# Patient Record
Sex: FEMALE | Race: WHITE
[De-identification: names, ages, dates, MRNs, and addresses within clinical notes are randomized per-mention and may not be internally consistent; named-entity substitution may affect disease eponyms.]

---

## 2018-01-30 ENCOUNTER — HOSPITAL ENCOUNTER (EMERGENCY)
Dept: HOSPITAL 17 - NEPC | Age: 28
LOS: 1 days | Discharge: HOME | End: 2018-01-31
Payer: MEDICAID

## 2018-01-30 VITALS
OXYGEN SATURATION: 98 % | SYSTOLIC BLOOD PRESSURE: 129 MMHG | TEMPERATURE: 98.1 F | HEART RATE: 125 BPM | RESPIRATION RATE: 16 BRPM | DIASTOLIC BLOOD PRESSURE: 66 MMHG

## 2018-01-30 VITALS — WEIGHT: 250.53 LBS | BODY MASS INDEX: 39.32 KG/M2 | HEIGHT: 67 IN

## 2018-01-30 DIAGNOSIS — R10.11: Primary | ICD-10-CM

## 2018-01-30 PROCEDURE — 76705 ECHO EXAM OF ABDOMEN: CPT

## 2018-01-30 PROCEDURE — 99281 EMR DPT VST MAYX REQ PHY/QHP: CPT

## 2018-01-30 NOTE — PD
HPI


Chief Complaint:  Abdominal Pain


Time Seen by Provider:  20:37


Travel History


International Travel<30 days:  No


Contact w/Intl Traveler<30days:  No


Traveled to known affect area:  No





History of Present Illness


HPI


28-year-old female presents to the emergency room for evaluation of right upper 

quadrant abdominal pain that started in the middle of the night last night.  

Patient went to [place] ED and had a negative workup including normal liver 

enzymes, lipase, troponin, CBC, UA, and CT abdomen and pelvis.  She reports 

pain is constant, sharp, like someone is ripping her insides out.  She was 

given Toradol without relief in symptoms.  She was then given morphine and 

Dilaudid.  States morphine helped a little but the Dilaudid did not help at 

all.  No alleviating or aggravating symptoms.  She had one episode of vomiting.

  No previous abdominal surgeries.  She was sent from [facility] for ultrasound of 

the gallbladder.





PFSH


Past Medical History


Autoimmune Disease:  No


Anxiety:  Yes


Depression:  Yes


Cancer:  No


Cardiovascular Problems:  No


Chemotherapy:  No


Diabetes:  Yes (Per mother pt has been since 2012)


Endocrine:  No


Headaches:  Yes (migraines)


Immune Disorder:  No


Neurologic:  Yes


Psychiatric:  Yes


Immunizations Current:  Yes


Radiation Therapy:  No


Seizures:  Yes


Sickle Cell Disease:  No


Thyroid Disease:  No


Pregnant?:  Not Pregnant





Past Surgical History


AICD:  No


Arteriovenous Shunt:  No


Insulin Pump:  No


Joint Replacement:  Yes (right reconstructive ankle surgery)


Pacemaker:  No





Social History


Alcohol Use:  No


Tobacco Use:  No


Substance Use:  No





Allergies-Medications


(Allergen,Severity, Reaction):  


Coded Allergies:  


     acetaminophen (Unverified  Allergy, Severe, VOMITING, 1/30/18)


     bee venom protein (honey bee) (Unverified  Allergy, Severe, SWELLING, 1/30/ 18)


     cefaclor (Unverified  Allergy, Severe, RASH, 1/30/18)


     crab (Unverified  Allergy, Severe, SWELLING, 1/30/18)


     propoxyphene (Unverified  Allergy, Severe, VOMITING, 1/30/18)


     quetiapine (Unverified  Allergy, Mild, Rash, 1/30/18)


 Mother reports today suspected allergy to seroquel


Reported Meds & Prescriptions





Reported Meds & Active Scripts


Active


Topamax (Topiramate) 100 Mg Tab 100 Mg PO Q12HR 30 Days


Metoprolol Tartrate 25 mg (Metoprolol Tartrate) 25 Mg Tab 12.5 Mg PO Q12HR 30 

Days


Keppra (Levetriacetam) 500 Mg Tab 1,000 Mg PO Q12HR 30 Days


Omeprazole 20 mg (Omeprazole) 20 Mg Tab 1 Tab PO BID


Reported


Trazodone (Trazodone HCl) 100 Mg Tablet 200 Mg PO HS


Ativan (Lorazepam) 0.5 Mg Tab 0.5 Mg PO BID








Review of Systems


Except as stated in HPI:  all other systems reviewed are Neg





Physical Exam


Narrative


GENERAL: Well-nourished, morbidly obese female in no acute distress.  Afebrile. 


SKIN: Focused skin assessment warm/dry.


HEAD: Normocephalic.


EYES: No scleral icterus. No injection or drainage. 


NECK: Supple, trachea midline. No JVD or lymphadenopathy.


CARDIOVASCULAR: Regular rate and rhythm without murmurs, gallops, or rubs. 


RESPIRATORY: Breath sounds equal bilaterally. No accessory muscle use.


GASTROINTESTINAL: Abdomen soft, nondistended.  No rebound tenderness.  No 

significant tenderness to palpation of the right upper quadrant.  No epigastric 

tenderness.





Data


Data


Last Documented VS





Vital Signs








  Date Time  Temp Pulse Resp B/P (MAP) Pulse Ox O2 Delivery O2 Flow Rate FiO2


 


1/30/18 23:59        


 


1/30/18 21:34   16     


 


1/30/18 20:16 98.1 125   98 Room Air  








Orders





 Orders


Us Abdomen Gallbladder (1/30/18 )


Morphine Inj (Morphine Inj) (1/30/18 21:00)


Hydromorphone Pf Inj (Dilaudid Pf Inj) (1/30/18 22:00)


Hydromorphone Pf Inj (Dilaudid Pf Inj) (1/30/18 22:15)


Oxycodone (Roxicodone) (1/31/18 00:00)


Metoclopramide (Reglan) (1/31/18 00:00)


Ed Discharge Order (1/30/18 23:59)








MDM


Medical Decision Making


Medical Screen Exam Complete:  Yes


Emergency Medical Condition:  Yes


Medical Record Reviewed:  Yes


Differential Diagnosis


Abdominal pain, cholecystitis, gallstones, pancreatitis


Narrative Course


28-year-old female presents from [facility] for right upper quadrant ultrasound.  

She had negative CT and negative workup in [facility].  Patient reports 

significant RUQ pain.  She was given pain medication prior to ultrasound but 

required a second dose before she would cooperate with imaging study.  She was 

given multiple IV and IM pain medications.  Ultrasound was delayed.  Patient 

signed out the nighttime provider pending ultrasound results.  Please see her 

note for disposition.


Condition:  Stable











Jael Duncan Jan 30, 2018 21:18

## 2018-01-30 NOTE — PD
Data


Data


Last Documented VS





Vital Signs








  Date Time  Temp Pulse Resp B/P (MAP) Pulse Ox O2 Delivery O2 Flow Rate FiO2


 


1/30/18 23:59        


 


1/30/18 21:34   16     


 


1/30/18 20:16 98.1 125   98 Room Air  








Orders





 Orders


Us Abdomen Gallbladder (1/30/18 )


Morphine Inj (Morphine Inj) (1/30/18 21:00)


Hydromorphone Pf Inj (Dilaudid Pf Inj) (1/30/18 22:00)


Hydromorphone Pf Inj (Dilaudid Pf Inj) (1/30/18 22:15)


Oxycodone (Roxicodone) (1/31/18 00:00)


Metoclopramide (Reglan) (1/31/18 00:00)


Ed Discharge Order (1/30/18 23:59)








[facility]


Medical Record Reviewed:  Yes


Supervised Visit with JAIRON:  No


Narrative Course


During the course of the patients emergency department visit, the patients 

history, examination, and differential diagnosis were reviewed with the 

patient. The patient was placed on a cardiac monitor with oximetry and frequent 

blood pressure monitoring. The patient had her IV infiltrated at the other 

emergency department and refused to have IV access placed again.  The patient's 

case was checked out to me by aJel.  The patient was initially seen by her.  

Please see her complete history and physical.  According to her the patient was 

initially evaluated at the [facility] emergency department related to right upper 

quadrant abdominal pain.  The patient had laboratory studies and a CT scan of 

the abdomen and pelvis done.  The CT scan of the abdomen and pelvis was 

unremarkable, laboratory studies showed no acute abdomen on a.  The patient was 

transferred to this emergency department for an ultrasound of the right upper 

quadrant of the abdomen.





The patient was initially provided morphine for pain, Zofran for nausea.





The patients laboratory studies were reviewed and remarkable for laboratory 

studies done at this other emergency department show white count of 9.5, 

hemoglobin 10.9, platelets 270 with a normal differential, CMP is remarkable 

for a chloride of 112, glucose 110, albumin 2.9, lipase 105.  Troponin I is 

less than 0.02, d-dimer 0.42 decreasing the likelihood of pulmonary embolism in 

this patient with no other significant risk factors.  Urinalysis is 

unremarkable.  A chest x-ray done at the other facility shows no acute 

abnormality.  CT scan of the abdomen and pelvis showed no acute abnormality.





Radiology studies were reviewed and remarkable for an ultrasound that shows 

fatty liver, no gallstones or biliary ductal dilatation.





I had a lengthy discussion with the patient and the patient's family.  The 

patient has had problems with abdominal pain in the past.  The patient is 

followed by a gastroenterologist.  The patient has a history being diagnosed 

with gastroparesis.  She last had endoscopy 2 years ago and is reportedly due 

to have another one to reevaluate her acid reflux.  She was started on a new 

medication for her gastroparesis 2 months ago, however they cannot recall the 

name of the medication.  She does have Reglan at home, however she has not been 

taking this.





I recommended to the patient that she restart the Reglan and call her 

gastroenterologist in the morning to schedule an appointment for follow-up 

regarding her evaluation in the emergency department.





The patient is resting comfortably and feels better, is alert and in no 

distress. The patients results and examination findings were discussed with the 

patient. The repeat examination is unremarkable and benign. The history, exam, 

diagnostic testing, and current condition do not suggest any significant 

pathology to warrant further testing, continued ED treatment, admission, or 

surgical evaluation at this point. The vital signs have been stable. The 

patient does not have uncontrollable pain, intractable vomiting, or other 

significant symptoms. The patient's condition is stable and appropriate for 

discharge. The patient will pursue further outpatient evaluation with a primary 

care physician or other designated or consulting physician as indicated in the 

discharge instructions. The patient expressed understanding and was agreeable 

with this plan.


Diagnosis





 Primary Impression:  


 Abdominal pain


 Qualified Codes:  R10.11 - Right upper quadrant pain


Referrals:  


Gastroenterologist


1 day





Primary Care Physician


2 days


Patient Instructions:  Abdominal Pain (ED), General Instructions


***Med/Other Pt SpecificInfo:  No Change to Meds


Disposition:  01 DISCHARGE HOME


Condition:  Stable











Liza Welch MD Jan 30, 2018 23:08

## 2023-09-02 NOTE — RADRPT
EXAM DATE/TIME:  01/30/2018 20:59 

 

[facility] COMPARISON:     

No previous studies available for comparison.

        

 

 

INDICATIONS :     

Right upper quadrant pain.

                     

 

MEDICAL HISTORY :     

Pregnancy.     Diabetic.  Seizures. Depression. Anxiety.

 

SURGICAL HISTORY :          

Right ankle surgery.

 

ENCOUNTER:     

Initial

 

ACUITY:     

1 day

 

PAIN SCORE:     

3/10

 

LOCATION:     

Right upper quadrant 

                     

MEASUREMENTS:     

 

LIVER:     

16.3 cm length 

 

COMMON DUCT:     

Non-visualized

 

RIGHT KIDNEY:     

11.8 x 5.7 x 5.6 cm

 

FINDINGS:     

 

LIVER:     

Increased echotexture without focal lesion or ductal dilatation.  

 

COMMON DUCT:     

No intraluminal mass or stone visualized.  

 

GALLBLADDER:          

Contains no stones, demonstrates no wall thickening or pericholecystic fluid.  

 

PANCREAS:          

The visualized portions are within normal limits.  

 

RIGHT KIDNEY:          

No evidence of hydronephrosis, stone, or mass.  

 

CONCLUSION:     

1. Fatty liver. No gallstones or biliary ductal dilatation identified.

 

 

 

 Joe Miranda MD on January 30, 2018 at 23:30           

Board Certified Radiologist.

 This report was verified electronically. 155